# Patient Record
Sex: MALE | Race: WHITE | NOT HISPANIC OR LATINO | Employment: UNEMPLOYED | URBAN - METROPOLITAN AREA
[De-identification: names, ages, dates, MRNs, and addresses within clinical notes are randomized per-mention and may not be internally consistent; named-entity substitution may affect disease eponyms.]

---

## 2018-01-18 NOTE — MISCELLANEOUS
Message  Return to work or school:      He is able to return to school on 11/30/2016          Signatures   Electronically signed by : Naresh Brown DO; Nov 28 2016 10:09AM EST                       (Author)    Electronically signed by : Naresh Brown DO; Nov 28 2016 10:17AM EST                       (Author)    Electronically signed by : Naresh Brown DO; Nov 28 2016  3:38PM EST                       (Author)

## 2018-01-18 NOTE — MISCELLANEOUS
Message  11/28/2016 - mother called - patient vomiting azithromycin; mother requesting amoxicillin 500 mg capsules - 2 BID Ã 10 days (#40)      Plan  Community acquired pneumonia    · Amoxicillin 500 MG Oral Capsule; TAKE 2 CAPSULES TWICE DAILY UNTIL GONE   · Azithromycin 200 MG/5ML Oral Suspension Reconstituted; take 1 teaspoon daily  until finished    Signatures   Electronically signed by : Lyric Cuba DO; Nov 28 2016  3:41PM EST                       (Author)

## 2024-09-11 VITALS — WEIGHT: 125 LBS | BODY MASS INDEX: 20.09 KG/M2 | HEIGHT: 66 IN

## 2024-09-11 DIAGNOSIS — M25.512 LEFT SHOULDER PAIN, UNSPECIFIED CHRONICITY: Primary | ICD-10-CM

## 2024-09-11 DIAGNOSIS — M25.511 RIGHT SHOULDER PAIN, UNSPECIFIED CHRONICITY: ICD-10-CM

## 2024-09-11 PROCEDURE — 99203 OFFICE O/P NEW LOW 30 MIN: CPT | Performed by: FAMILY MEDICINE

## 2024-09-11 NOTE — PROGRESS NOTES
Assessment:     1. Left shoulder pain, unspecified chronicity        2. Right shoulder pain, unspecified chronicity          No orders of the defined types were placed in this encounter.       Impression:   B/L shoulder pain likely secondary overuse/overtraining.      Conservative Management   We discussed different treatment options:  No documentation to review  Ice or Heat Therapy as needed 1-2 times daily for 10-20 minutes. As tolerated.   Over the counter Tylenol and/or NSAIDs  as needed based off your Past Medical Hx. Please follow product label for dosing and maximum limits.    Please range joint through gentle range of motion as tolerated.   Recommend journaling during weightlifting/training.  Recommend utilizing National strength and conditioning Association guidelines.  Recommended to keep organized sport hours less than patient's age per week.        Imaging   No imaging was available for review today.    Procedure  Not appropriate at this time.     Shared decision making, patient agreeable to plan.      No follow-ups on file.    HPI:   Frantz Martines is a RHD 14 y.o. male  who presents for evaluation of   Chief Complaint   Patient presents with    Right Shoulder - Swelling, Pain     Pain score 3   lift about 100     Left Shoulder - Swelling, Pain     Felt a pull  has been going on for a couple of month pain score 4 when picking something        Athlete: Yes; weight lifting  Occupation: Student    Onset/Mechanism: Four months ago pulled left shoulder with pull over. Pulled right shoulder on bench approx two months ago. Pain resolved b/l. Then at beach four weeks ago had pain at rest in bilateral shoulder.   Location: L>R. Lateral shoulder .  Severity: Current severity: 0/10. Max severity: 4-5/10.  Pain described as:tight, pulling   Radiation: denies pain radiating up into neck or down arm past elbow .  Provocative: picking up heavy objects.  Associated symptoms: popping shoulders .  Bench press 100lbs  "    Denies any hx of fracture of affected limb.   Denies any surgical history of affected limb.      Summary of treatment to-date:   Rest with improving       Following History Reviewed and Updated   History reviewed. No pertinent past medical history.  History reviewed. No pertinent surgical history.  History reviewed. No pertinent family history.    Social History     Substance and Sexual Activity   Alcohol Use Never     Social History     Substance and Sexual Activity   Drug Use Never     Social History     Tobacco Use   Smoking Status Never    Passive exposure: Never   Smokeless Tobacco Never       Social Determinants of Health     Caregiver Education and Work: Not on file   Caregiver Health: Not on file   Adolescent Substance Use: Not on file   Financial Resource Strain: Not on file   Food Insecurity: Not on file   Intimate Partner Violence: Not on file   Physical Activity: Not on file   Stress: Not on file   Transportation Needs: Not on file   Housing Stability: Not on file   Utilities: Not on file   Health Literacy: Not on file   Postpartum Depression: Not on file   Depression: Not on file        Not on File    Review of Systems      Review of Systems     Review of Systems   Constitutional: Negative for chills and fever.   HENT: Negative for drooling and sneezing.    Eyes: Negative for redness.   Respiratory: Negative for cough and wheezing.    Gastrointestinal: Negative for vomiting.   Psychiatric/Behavioral: Negative for behavioral problems. The patient is not nervous/anxious.      All other systems negative.   Physical Exam   Physical Exam    Vitals and nursing note reviewed.  Constitutional:   Appearance. Normal Appearance.  Ht 5' 6\" (1.676 m)   Wt 56.7 kg (125 lb)   BMI 20.18 kg/m²     Body mass index is 20.18 kg/m².   HENT:  Head: Atraumatic.  Nose: Nose normal  Eyes: Conjunctiva/sclera: Conjunctivae normal.  Cardiovascular:   Rate and Rhythm: Bilateral equal distal pulses  Pulmonary:   Effort: " Pulmonary effort is normal  Skin:   General: Skin is warm and dry.  Neurological:   General: No focal deficit present.  Mental Status: Alert and oriented to person, place, and time.   Psychiatric:   Mood and Affect: mood normal.  Behavior: Behavior normal     Musculoskeletal Exam     Ortho Exam      B/L  Shoulder:     INSPECTION:  Gross deformity Swelling Ecchymosis Increased warmth   Negative        PALPATION/TENDERNESS:  Acromion Clavicle Scapula/spine of scapula AC joint   Negative Neg. Neg. Neg.     Subacromial bursa Long head of the biceps Coracoid process Trapezius/periscapular region   Neg. Neg. Neg. Neg.     RANGE OF MOTION:  C-Spine Flexion C-Spine Extension C-Spine Sidebending C-Spine Rotation    intact intact intact intact     Internal rotation in 90 degrees Abduction External rotation in 90 degrees Abduction Internal rotation in Adduction External rotation in Adduction   Intact intact Thoracic spine intact      Forward Flexion Abduction   intact intact     STRENGTH:  Flexion Abduction Adduction   Intact Intact Intact       Okay Sign Finger abduction Thumb extension   Intact, bilaterally Intact, bilaterally Intact, bilaterally       ROTATOR CUFF:  Rotator cuff tear  Supraspinatus  Infraspinatus  Subscapularis    (Drop-Arm) (Empty can) (External rotation against resistance) (Belly press)   negative negative negative negative     IMPINGEMENT:  Neer's Ardon-Landon         BICEPS TENDINOPATHY:  Resisted forward flexion  Resisted supination   (Speed's) (Yergason's):    Negative  N/a      AC JOINT:  Forced cross body adduction (Scarf cross-arm) Job's AC compression   Negative Negative     LABRUM:  Romero's: Labral Crank Test:    Negative Negative     APPREHENSION  Apprehension Altagracia's Relocation Maneuver:    Negative Negative     Special test:  Spurlings    N/A       Special test:  Inferior angle prominence/anterior tilt of scapula Medial border prominence / winging of the scapula  Early scapular elevation or  "excessive /insufficient upward rotation during arm evaluation    Negative  negative Negative        Distal Sensation  Radial Pulse   Intact Bilaterally  Present and Equal Bilaterally               Procedures       Portions of the record may have been created with voice recognition software. Occasional wrong word or \"sound alike\" substitutions may have occurred due to the inherent limitations of voice recognition software. Please review the chart carefully and recognize, using context, where substitutions/typographical errors may have occurred.   "